# Patient Record
Sex: MALE | Race: WHITE | NOT HISPANIC OR LATINO | URBAN - METROPOLITAN AREA
[De-identification: names, ages, dates, MRNs, and addresses within clinical notes are randomized per-mention and may not be internally consistent; named-entity substitution may affect disease eponyms.]

---

## 2019-09-05 ENCOUNTER — OFFICE VISIT CONVERTED (OUTPATIENT)
Dept: OTHER | Facility: HOSPITAL | Age: 84
End: 2019-09-05
Attending: INTERNAL MEDICINE

## 2019-09-12 ENCOUNTER — OFFICE VISIT CONVERTED (OUTPATIENT)
Dept: OTHER | Facility: HOSPITAL | Age: 84
End: 2019-09-12
Attending: INTERNAL MEDICINE

## 2019-10-17 ENCOUNTER — CONVERSION ENCOUNTER (OUTPATIENT)
Dept: OTHER | Facility: HOSPITAL | Age: 84
End: 2019-10-17

## 2019-11-20 ENCOUNTER — HOSPITAL ENCOUNTER (OUTPATIENT)
Dept: GENERAL RADIOLOGY | Facility: HOSPITAL | Age: 84
Discharge: HOME | End: 2019-11-20

## 2021-05-28 VITALS
RESPIRATION RATE: 18 BRPM | BODY MASS INDEX: 23.74 KG/M2 | TEMPERATURE: 97.5 F | WEIGHT: 160.3 LBS | HEART RATE: 60 BPM | TEMPERATURE: 97.3 F | SYSTOLIC BLOOD PRESSURE: 115 MMHG | HEART RATE: 64 BPM | OXYGEN SATURATION: 96 % | RESPIRATION RATE: 16 BRPM | HEIGHT: 69 IN | BODY MASS INDEX: 23.44 KG/M2 | DIASTOLIC BLOOD PRESSURE: 66 MMHG | HEART RATE: 61 BPM | WEIGHT: 157 LBS | SYSTOLIC BLOOD PRESSURE: 118 MMHG | DIASTOLIC BLOOD PRESSURE: 71 MMHG | OXYGEN SATURATION: 94 % | SYSTOLIC BLOOD PRESSURE: 121 MMHG | OXYGEN SATURATION: 97 % | DIASTOLIC BLOOD PRESSURE: 66 MMHG | WEIGHT: 158.3 LBS | TEMPERATURE: 97.5 F | HEIGHT: 69 IN | HEIGHT: 69 IN | BODY MASS INDEX: 23.25 KG/M2 | RESPIRATION RATE: 16 BRPM

## 2021-05-28 NOTE — PROGRESS NOTES
Patient: KARELY HUERTA     Acct: [id number]     Report: #[id number]  UNIT #: [id number]     : 1927    Encounter Date:2019  PRIMARY CARE: KAHLIL BROOKE  ***Signed***  --------------------------------------------------------------------------------------------------------------------  DATE: 19      Primary Care Provider      Primary Care Provider:  KAHLIL BROOKE            Referring Physician      Referring Provider:  KAHLIL BROOKE            Chief Complaint      FU Anemia            Subjective      89-year-old white male was referred to me for anemia.  A CBC has been done on     this patient because of weakness.            Patient claims he had a heart attack 3 years ago and since then patient has been    on aspirin 81 mg/day.            Past Med/Surg History            Past Med/Surg History:   Hypertension             No Diabetes Mellitus             Heart Disease             Cancer (Skin)             Lung Disease (COPD)             Other (Enlarged Prostate)            Social History      Social History:  Tobacco Use (Former smoker- quit ); No Alcohol Use, No     Recreational Drug use            Allergies      Coded Allergies:             PENICILLINS (Verified  Allergy, Unknown, 19)            Medications      Cholecalciferol (Vitamin D3*) 400 Units Capsule      400 UNITS PO QDAY for 30 Days, #30 CAP         Reported         19       Aspirin Chew (Aspirin Baby) 81 Mg Tab.chew      81 MG PO QDAY, #30 TAB.CHEW 0 Refills         Reported         19       Multivitamins (Multiple Vitamin) 1 Each Tablet      1 TAB PO QDAY, #30 TAB 0 Refills         Reported         19       Folic Acid (Folic Acid*) 1 Mg Tablet      1 MG PO QDAY, #30 TAB 0 Refills         Reported         19       Tamsulosin HCL (Tamsulosin HCL) 0.4 Mg Capsule      0.4 MG PO QDAY, #30 CAP 0 Refills         Reported         19       Carvedilol (Carvedilol) 3.125 Mg Tablet      3.125 MG PO QDAY, #30 TAB 0 Refills          Reported         9/5/19       Pantoprazole (Protonix*) 40 Mg Tablet.dr      40 MG PO QDAY@07, #30 TAB 0 Refills         Reported         9/5/19       MDI-Albuterol (Ventolin HFA) 8 Gm Hfa.aer.ad      2 PUFFS INH Q6H PRN for SHORTNESS OF BREATH, #1 MDI 0 Refills         Reported         9/5/19       Atorvastatin (Atorvastatin) 40 Mg Tablet      40 MG PO HS, #30 TAB 0 Refills         Reported         9/5/19       Citalopram HBr (Citalopram HBr) 40 Mg Tablet      40 MG PO QDAY, #30 TAB 0 Refills         Reported         9/5/19       Benzonatate (Tessalon Perles) 100 Mg Capsule      100 MG PO TID PRN for COUGH, CAP         Reported         9/5/19       Hydrocodone/Acetaminophen 10/325 MG (Hydrocodone/Acetaminophen 10/325 MG) 1 Tab     Tablet      1 TAB PO BID, TAB 0 Refills         Reported         9/5/19       Gabapentin (Gabapentin) 300 Mg Capsule      300 MG PO TID, #90 CAP 0 Refills         Reported         9/5/19       NEB-Albuterol Sulf (Albuterol Sulfate) 5 Mg/1 Ml Solution      2.5 MG INH RTQID, #3 BOTTLE 0 Refills         Reported         9/5/19       Neb-Albuterol/Ipratropium (Ipratropium/Albuterol) 3 Ml Ampul.neb      3 ML INH RTQID, #120 NEB 0 Refills         Reported         9/5/19       Furosemide (Furosemide) 80 Mg Tablet      80 MG PO BID, #30 TAB 0 Refills         Reported         9/5/19       Potassium Chloride (K-Dur*) 20 Meq Tab.er.prt      20 MEQ PO QDAY, #30 TAB 0 Refills         Reported         9/5/19      Current Medications      Current Medications Reviewed 9/12/19            Review of Systems      General:  Anxiety, Fatigue Scale: (8), Pain Scale: (0)      HEENT:  No Dysphagia, No Hearing Changes      Respiratory:  Cough, Shortness of Air, Sputum Changes (yellow/green), Wheezing      Cardiovascular:  No Chest Pain      Gastrointestinal:  No Nausea, No Vomiting; Constipation, Appetite Fair (Fair)      Genitourinary:  No Nocturia      Musculoskeletal:  No Joint Effusions, No Joint  Tenderness      Endocrine:  No Heat Intolerance      Hematologic:  No Bleeding      Allergic/Immunologic:  No Hives, No Throat closing off      Psychological:  Anxiety; No Depression      Neurological:  No Headaches; Weakness, Numbness (Feet)      Skin:  No Rash            Vitals      Height 5 ft 9 in / 175.26 cm      Weight 158 lbs 4.8 oz / 71.184099 kg      BSA 1.87 m2      BMI 23.4 kg/m2      Temperature 97.3 F / 36.28 C      Pulse 60      Respirations 16      Blood Pressure 115/66      Pulse Oximetry 96%            Exam      Constitutional:  No acute distress, Conversant, Pleasant      Eyes:  Anicteric sclerae, Palpebral Conjunctivae (Pink), WISAM      HENT:  Oropharynx clear; No Erythema; Buccal mucosae (Pink)      Neck:  Supple, Full Range of Motion      Cardiovascular:  RRR; No Murmurs; Normal PMI; No Peripheral Edema      Lungs:  Clear to Ausculation, Normal Respiratory Effort      Abdomen:  Soft, NABS; No Tenderness      Chest:  Other (Symmetrical)      Lymphatic:  No Neck      Extremities:  No digital cyanosis, No digital ischemia, Normal gait, Other (No     deformity)      Neurological:  Cranial Nerve II-XII (Intact); No Focal Sensory deficits      Psychological:  Appropriate affect, Appropriate mood, Intact judgement, Alert      Skin:  Other (No dermatosis)            Lab Results      Serum protein electrophoresis on this patient showed an asymmetrical gamma.      CMP showed BUN 24, carbon dioxide 40, iron 44, percent iron saturation 14      B12 folate ferritin T4 levels are normal      White count 7.2, hemoglobin 10.2, hematocrit 33.7, platelet count 285,000 MCV     was high at 98.5 MCHC was low at 30.3            Impression/Problem List      Anemia, chronic, rule out myelodysplastic syndrome.        Iron deficiency      Chronic obstructive pulmonary disease      Hypertension      BPH      Peripheral neuropathy      Depression      Coronary artery disease      Notes      New Medications      *  Tamsulosin HCL 0.4 MG CAPSULE: 0.4 MG PO QDAY #30      * Folic Acid (Folic Acid*) 1 MG TABLET: 1 MG PO QDAY #30      * MULTIVITAMINS (Multiple Vitamin) 1 EACH TABLET: 1 TAB PO QDAY #30      * Aspirin Chew (Aspirin Baby) 81 MG TAB.CHEW: 81 MG PO QDAY #30      * CHOLECALCIFEROL (Vitamin D3*) 400 UNITS CAPSULE: 400 UNITS PO QDAY 30 Days #30            Plan      Recommended the patient possibly stop his aspirin and may be obtain a     colonoscopy although at the age of 89 this may not be beneficial.      Feraheme 510 mg IV x1 dose      Return in 8 weeks with a CBC, iron profile, ferritin, reticulocyte count.            Patient Education:        Anemia            PREVENTION      2 or More Falls Past Year?:  No      Fall Past Year with Injury?:  No      Chart initiated by      SUZANNE Patton MA                 Disclaimer: Converted document may not contain table formatting or lab diagrams. Please see [employer] [facility] for the authenticated document.

## 2021-05-28 NOTE — PROGRESS NOTES
Patient: KARELY HUERTA     Acct: [id number]     Report: #[id number]  UNIT #: [id number]     : 1927    Encounter Date:2019  PRIMARY CARE: KAHLIL BROOKE  ***Signed***  --------------------------------------------------------------------------------------------------------------------  DATE: 19      Primary Care Provider:  KAHLIL BROOKE      Referring Provider:  KAHLIL BROOKE      Reason For Consult      NP Consult-Anemia            History of Present Illness      89-year-old white male was referred for chronic anemia.  Patient has a CBC done     last year because of weakness.  Patient complains of sore muscles in his neck     and back.            Past Medical/Surgical History             Hypertension             No Diabetes Mellitus             Heart Disease             Cancer (Skin)             Lung Disease (COPD)             Other (Enlarged Prostate)            Cardiac surgery in 2016            Pyschiatric History      No Depression; Anxiety; No Panic Attacks            Social History      Social History:  Tobacco Use (Former smoker- quit ); No Alcohol Use, No     Recreational Drug use            Family History      Hypertension (Mother); No Diabetes Mellitus, No Heart Disease, No Blood Clots            Allergies      Coded Allergies:             Penicillins (Verified  Allergy, Unknown, 19)            Medications      Carvedilol (Carvedilol) 3.125 Mg Tablet      3.125 MG PO QDAY, #30 TAB 0 Refills         Reported         19       Losartan Potassium (Losartan*) 50 Mg Tablet      50 MG PO QDAY, #30 TAB 0 Refills         Reported         19       Pantoprazole (Protonix*) 40 Mg Tablet.dr      40 MG PO QDAY@07, #30 TAB 0 Refills         Reported         19       MDI-Albuterol (Ventolin HFA) 8 Gm Hfa.aer.ad      2 PUFFS INH Q6H PRN for SHORTNESS OF BREATH, #1 MDI 0 Refills         Reported         19       Atorvastatin (Atorvastatin) 40 Mg Tablet      40 MG PO HS, #30 TAB 0 Refills          Reported         9/5/19       Citalopram HBr (Citalopram HBr) 40 Mg Tablet      40 MG PO QDAY, #30 TAB 0 Refills         Reported         9/5/19       Citalopram HBr (Citalopram HBr) 20 Mg Tablet      20 MG PO QDAY, #30 TAB 0 Refills         Reported         9/5/19       Benzonatate (Tessalon Perles) 100 Mg Capsule      100 MG PO TID PRN for COUGH, CAP         Reported         9/5/19       Hydrocodone/Acetaminophen 10/325 MG (Hydrocodone/Acetaminophen 10/325 MG) 1 Tab     Tablet      1 TAB PO BID, TAB 0 Refills         Reported         9/5/19       Gabapentin (Gabapentin) 300 Mg Capsule      300 MG PO TID, #90 CAP 0 Refills         Reported         9/5/19       NEB-Albuterol Sulf (Albuterol Sulfate) 5 Mg/1 Ml Solution      2.5 MG INH RTQID, #3 BOTTLE 0 Refills         Reported         9/5/19       Neb-Albuterol/Ipratropium (Ipratropium/Albuterol) 3 Ml Ampul.neb      3 ML INH RTQID, #120 NEB 0 Refills         Reported         9/5/19       Furosemide (Furosemide) 80 Mg Tablet      80 MG PO BID, #30 TAB 0 Refills         Reported         9/5/19       Potassium Chloride (K-Dur*) 20 Meq Tab.er.prt      20 MEQ PO QDAY, #30 TAB 0 Refills         Reported         9/5/19      Current Medications      Current Medications Reviewed 9/5/19            Review of Systems      Abnormal as noted below; all other systems have been reviewed and are negative.      General:  Anxiety, Fatigue Scale: (9), Pain Scale: (7)      HEENT:  No Dysphagia; Visual Changes (Cataract)      Respiratory:  Cough, Shortness of Air, Wheezing      Cardiovascular:  Chest Pain; No Pedal Edema      Gastrointestinal:  No Nausea, No Vomiting; Constipation, Appetite Good (Good)      Genitourinary:  No Nocturia      Musculoskeletal:  No Joint Effusions, No Joint Tenderness; Aches, Pains (Back,     neck, arthritis)      Endocrine:  No Heat Intolerance      Hematologic:  No Bleeding, No Bruising      Allergic/Immunologic:  No Hives      Psychological:  Anxiety; No  Depression      Neurological:  No Headaches; Weakness, Numbness (Feet)      Skin:  No Rash, No Open Wounds      Vitals:             Height 5 ft 9 in / 175.26 cm           Weight 157 lbs 0 oz / 71.484433 kg           BSA 1.86 m2           BMI 23.2 kg/m2           Temperature 97.5 F / 36.39 C           Pulse 64           Respirations 18           Blood Pressure 118/71           Pulse Oximetry 97%            Exam      Constitutional:  No acute distress, Conversant      Eyes:  Anicteric sclerae, Palpebral Conjunctivae (Pink), WISAM      HENT:  Oropharynx clear; No Erythema; Buccal mucosae (Purplish)      Neck:  Supple, Full Range of Motion      Lungs:  Clear to Ausculation, Normal Respiratory Effort      Cardiovascular:  RRR; No Murmurs; Normal PMI; No Peripheral Edema      Abdomen:  Soft, NABS; No Masses, No Tenderness      Skin:  Normal temperature, Other      Extremities:  No digital cyanosis, No digital ischemia, Normal gait, Other (No     deformity)      Psychiatric:  Appropriate affect, Intact judgement, AAO x 3      Neurological:  Cranial Nerve II-XII (Intact); No Focal Sensory deficits      Lymphatic:  No Neck            Lab Results      June 12, 2019 hemoglobin 10.5/hematocrit 34.7, white count and platelet counts     were normal      On April 1, 2019 hemoglobin hematocrit 9.2/30.4      On April 1 CMP showed a carbon dioxide of 41            Impression/Problem List      Anemia, chronic, rule out myelodysplastic syndrome      Chronic obstructive pulmonary disease      Hypertension      BPH      Peripheral neuropathy      Depression      Coronary artery disease            Plan      The following laboratory studies were performed CBC, reticulocyte count      CMP, iron profile, ferritin      B12, folate      Zinc, copper      Serum protein electrophoresis with immunofixation, kappa and lambda light chains      T4, TSH      Thank you very much for allowing me to participate in the care of your patient.     I will  keep you posted on the progress of his workup.            Patient Education:        Anemia            PREVENTION      Chart initiated by      SUZANNE Patton MA                 Disclaimer: Converted document may not contain table formatting or lab diagrams. Please see [email or web address] for the authenticated document.